# Patient Record
Sex: MALE | Race: BLACK OR AFRICAN AMERICAN | NOT HISPANIC OR LATINO | Employment: FULL TIME | URBAN - METROPOLITAN AREA
[De-identification: names, ages, dates, MRNs, and addresses within clinical notes are randomized per-mention and may not be internally consistent; named-entity substitution may affect disease eponyms.]

---
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
## 2019-12-18 ENCOUNTER — OCCUPATIONAL HEALTH (OUTPATIENT)
Dept: URGENT CARE | Facility: CLINIC | Age: 19
End: 2019-12-18

## 2019-12-18 DIAGNOSIS — Z02.1 PRE-EMPLOYMENT EXAMINATION: Primary | ICD-10-CM

## 2019-12-18 PROCEDURE — 36415 COLL VENOUS BLD VENIPUNCTURE: CPT | Mod: S$GLB,,, | Performed by: PHYSICIAN ASSISTANT

## 2019-12-18 PROCEDURE — 93000 ELECTROCARDIOGRAM COMPLETE: CPT | Mod: S$GLB,,, | Performed by: INTERNAL MEDICINE

## 2019-12-18 PROCEDURE — 99000 SPECIMEN HANDLING: ICD-10-PCS | Mod: S$GLB,,, | Performed by: PHYSICIAN ASSISTANT

## 2019-12-18 PROCEDURE — 99499 UNLISTED E&M SERVICE: CPT | Mod: S$GLB,,, | Performed by: PHYSICIAN ASSISTANT

## 2019-12-18 PROCEDURE — 93000 EKG 12-LEAD: ICD-10-PCS | Mod: S$GLB,,, | Performed by: INTERNAL MEDICINE

## 2019-12-18 PROCEDURE — 99499 PHYSICAL, BASIC COMPLEXITY: ICD-10-PCS | Mod: S$GLB,,, | Performed by: PHYSICIAN ASSISTANT

## 2019-12-18 PROCEDURE — 99000 SPECIMEN HANDLING OFFICE-LAB: CPT | Mod: S$GLB,,, | Performed by: PHYSICIAN ASSISTANT

## 2019-12-18 PROCEDURE — 36415 VENIPUNCTURE: ICD-10-PCS | Mod: S$GLB,,, | Performed by: PHYSICIAN ASSISTANT

## 2020-05-19 ENCOUNTER — HOSPITAL ENCOUNTER (EMERGENCY)
Facility: HOSPITAL | Age: 20
Discharge: HOME | End: 2020-05-19
Attending: EMERGENCY MEDICINE
Payer: MEDICAID

## 2020-05-19 VITALS
SYSTOLIC BLOOD PRESSURE: 140 MMHG | HEIGHT: 72 IN | WEIGHT: 180 LBS | BODY MASS INDEX: 24.38 KG/M2 | TEMPERATURE: 99 F | RESPIRATION RATE: 18 BRPM | HEART RATE: 76 BPM | DIASTOLIC BLOOD PRESSURE: 75 MMHG | OXYGEN SATURATION: 100 %

## 2020-05-19 DIAGNOSIS — L30.9 DERMATITIS: Primary | ICD-10-CM
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
## 2020-05-19 PROCEDURE — 99284 EMERGENCY DEPT VISIT MOD MDM: CPT

## 2020-05-19 RX ORDER — HYDROCORTISONE 1 %
CREAM (GRAM) TOPICAL
Qty: 30 G | Refills: 0 | Status: SHIPPED | OUTPATIENT
Start: 2020-05-19 | End: 2021-12-30

## 2020-05-19 RX ORDER — PERMETHRIN 50 MG/G
CREAM TOPICAL
Qty: 60 G | Refills: 1 | Status: SHIPPED | OUTPATIENT
Start: 2020-05-19 | End: 2021-12-30

## 2020-05-19 NOTE — ED NOTES
Patient presents to ED secondary to skin problem. Patient believes he has multiple insect bites to body. States was staying at hotel and it just occurred pta. Three raised spots noted to right shoulder and left third digit. Patient c/o burning and itching sensation. NAD noted.     APPEARANCE: Alert, oriented and in no acute distress.  CARDIAC: Normal rate   PERIPHERAL VASCULAR: peripheral pulses present. Normal cap refill. No edema. Warm to touch.    RESPIRATORY:Normal rate and effort. Respirations are equal and unlabored no obvious signs of distress.  GASTRO: soft, bowel sounds normal, no tenderness, no abdominal distention.  MUSC: Full ROM. No bony tenderness or soft tissue tenderness. No obvious deformity.  SKIN: Skin is warm and dry, normal skin turgor, mucous membranes moist.  NEURO: 5/5 strength major flexors/extensors bilaterally. Sensory intact to light touch bilaterally. [place] coma scale: eyes open spontaneously-4, oriented & converses-5, obeys commands-6. No neurological abnormalities.

## 2020-05-19 NOTE — ED PROVIDER NOTES
Encounter Date: 5/19/2020       History     Chief Complaint   Patient presents with    Skin Problem     Patient presents to ED secondary to skin problem. Patient believes he has multiple insect bites to body. States was staying at hotel and it just occurred pta. Three raised spots noted to right shoulder and left third digit.      Roxane Abraham is a 19 y.o. male who  has no past medical history on file.    The patient presents to the ED due to itchy rash that started just prior to arrival.  He reports checking into a hotel last night, and went to sleep, but woke up 30 minutes ago with itching to his right shoulder, right forearm, and left hand.  He reports a history of sensitive skin, but is not currently on any medications.  He initially thought the symptoms were due to his skin being sensitive, but noticed it became worse, itchy, and he noticed raised areas to his skin.  He denies any other areas of rash, fever, or other systemic symptoms.  He denies any known allergies.  He has no other complaints.        Review of patient's allergies indicates:  No Known Allergies  History reviewed. No pertinent past medical history.  History reviewed. No pertinent surgical history.  Family History   Problem Relation Age of Onset    Hypertension Mother      Social History     Tobacco Use    Smoking status: Never Smoker   Substance Use Topics    Alcohol use: No    Drug use: No     Review of Systems   Constitutional: Negative for chills and fever.   HENT: Negative for sore throat.    Respiratory: Negative for shortness of breath.    Cardiovascular: Negative for chest pain.   Gastrointestinal: Negative for constipation, diarrhea, nausea and vomiting.   Genitourinary: Negative for dysuria, frequency and urgency.   Musculoskeletal: Negative for arthralgias and back pain.   Skin: Positive for rash. Negative for wound.   Neurological: Negative for weakness.   Hematological: Does not bruise/bleed easily.    Psychiatric/Behavioral: Negative for agitation, behavioral problems and confusion.       Physical Exam     Initial Vitals [05/19/20 0037]   BP Pulse Resp Temp SpO2   (!) 140/75 76 18 99.1 °F (37.3 °C) 100 %      MAP       --         Physical Exam    Nursing note and vitals reviewed.  Constitutional: He appears well-developed and well-nourished. He is not diaphoretic. No distress.   HENT:   Head: Normocephalic and atraumatic.   Mouth/Throat: Oropharynx is clear and moist.   Eyes: EOM are normal. Pupils are equal, round, and reactive to light.   Neck: No tracheal deviation present.   Cardiovascular: Normal rate, regular rhythm, normal heart sounds and intact distal pulses.   Pulmonary/Chest: Breath sounds normal. No stridor. No respiratory distress.   Abdominal: Soft. He exhibits no distension and no mass. There is no tenderness.   Musculoskeletal: Normal range of motion. He exhibits no edema.   Neurological: He is alert and oriented to person, place, and time. No cranial nerve deficit or sensory deficit.   Skin: Skin is warm and dry. Capillary refill takes less than 2 seconds. Rash noted.   Small, slightly raised, pruritic areas to R shoulder, R forearm, and L 3rd finger.  No pustules, puncture wounds, blistering, bruising, or other skin changes.   Psychiatric: He has a normal mood and affect. His behavior is normal. Thought content normal.         ED Course   Procedures  Labs Reviewed - No data to display       Imaging Results    None          Medical Decision Making:   History:   Old Medical Records: I decided to obtain old medical records.  Old Records Summarized: other records.  Initial Assessment:   18 yo M with rash starting 30 min ago after sleeping in a new hotel room.  Vitals reassuring, exam with slightly raised, tender, pruritic areas to shoulder, R arm, and L hand.  No skin changes to suggest insect bite, scabies, or infectious process.  Possibly mild allergic reaction vs non-specific dermatitis.  Will  treat symptomatically with hydrocortisone cream. Given location, will cover for scabies if symptoms do not improve, RX permethrin and instructed on proper use. No diffuse rash or MM involvement.   Patient stable for D/C.  Differential Diagnosis:   Differential Diagnosis includes, but is not limited to:  Cellulitis, MRSA, drug eruption, allergic reaction, contact dermatitis, viral exanthem, local trauma/contusion.  ED Management:  After complete evaluation, including thorough history and physical exam, the patient's symptoms are most consistent with non-specific dermatitis, possible allergic reaction. The exact etiology of the patient's rash is currently unknown, but appears to be benign at this time. There are no concerning features to suggest acute infection, cellulitis, abscess, or necrotizing fasciitis.  There is no diffuse skin or mucous membrane involvement to suggest TEN/SJS. The appearance does not suggest Lyme/tick disease, syphilis, scabies/bedbugs, or fungal infection.    At this time, I feel there is no emergent condition requiring admission or further testing. Findings and clinical impression discussed with patient. Questions were answered, and patient stated understanding. Patient instructed to follow-up with their PCP or the one provided in 2-3 days. Strict return precautions were discussed, including new or worsening symptoms.      On re-evaluation, the patient's status has improved.  After complete ED evaluation, clinical impression is most consistent with dermatitis/allergic reaction.  PCP follow-up within 2-3 days was recommended.    After taking into careful account the patient's history, physical exam findings, as well as empirical and objective data obtained throughout ED workup, I feel no emergent medical condition has been identified. No further evaluation or admission was felt to be required, and the patient is stable for discharge from the ED. The patient and any additional family present  were updated with test results, overall clinical impression, and recommended further plan of care, including discharge instructions as provided and outpatient follow-up for continued evaluation and management as needed. All questions were answered. The patient expressed understanding and agreed with current plan for discharge and follow-up plan of care. Strict ED return precautions were provided, including return/worsening of current symptoms, new symptoms, or any other concerns.                                   Clinical Impression:       ICD-10-CM ICD-9-CM   1. Dermatitis L30.9 692.9   2. Insect bite, unspecified site, initial encounter W57.XXXA 919.4     E906.4             ED Disposition Condition    Discharge Stable        ED Prescriptions     Medication Sig Dispense Start Date End Date Auth. Provider    hydrocortisone 1 % cream Apply to affected area 2 times daily 30 g 5/19/2020  Tristin De Paz MD    permethrin (ELIMITE) 5 % cream Apply to entire body from neck to toes one time.  Leave on for 12 hours then wash off.  Reapply in 1 week if rash does not improve. 60 g 5/19/2020  Tristin De Paz MD        Follow-up Information     Follow up With Specialties Details Why Contact Info    Elvira Kruger MD Family Medicine Schedule an appointment as soon as possible for a visit   [address] [facility] [address]  [phone]                                       Tristin De Paz MD  05/19/20 0053

## 2020-05-19 NOTE — DISCHARGE INSTRUCTIONS
Your rash appears to be due to a nonspecific dermatitis, or possibly a mild allergic reaction. You may take benadryl and use hydrocortisone cream to help with itching.  If these treatments do not improve, or you notice worsening/spreading rash, your symptoms may be due to insect/scabies bites. Use permethrin cream as directed to treat your symptoms.   Follow up with your PCP as soon as possible for re-evaluation if your symptoms do not improve.  Return to the ED immediately if you develop any diffuse or entire-body rash, difficulty breathing, mouth/face swelling, or any other concerning symptoms.

## 2021-09-21 ENCOUNTER — HOSPITAL ENCOUNTER (EMERGENCY)
Facility: HOSPITAL | Age: 21
Discharge: HOME | End: 2021-09-21
Attending: EMERGENCY MEDICINE
Payer: MEDICAID

## 2021-09-21 VITALS
HEART RATE: 83 BPM | WEIGHT: 210 LBS | RESPIRATION RATE: 16 BRPM | SYSTOLIC BLOOD PRESSURE: 134 MMHG | TEMPERATURE: 98 F | HEIGHT: 72 IN | BODY MASS INDEX: 28.44 KG/M2 | OXYGEN SATURATION: 99 % | DIASTOLIC BLOOD PRESSURE: 70 MMHG

## 2021-09-21 DIAGNOSIS — J06.9 VIRAL URI: Primary | ICD-10-CM

## 2021-09-21 LAB
CTP QC/QA: YES
GROUP A STREP, MOLECULAR: NEGATIVE
SARS-COV-2 RDRP RESP QL NAA+PROBE: NEGATIVE

## 2021-09-21 PROCEDURE — 87651 STREP A DNA AMP PROBE: CPT | Performed by: PHYSICIAN ASSISTANT

## 2021-09-21 PROCEDURE — 99282 EMERGENCY DEPT VISIT SF MDM: CPT | Mod: 25

## 2021-09-21 PROCEDURE — U0002 COVID-19 LAB TEST NON-CDC: HCPCS | Performed by: NURSE PRACTITIONER

## 2021-12-30 ENCOUNTER — HOSPITAL ENCOUNTER (EMERGENCY)
Facility: HOSPITAL | Age: 21
Discharge: LEFT BEFORE DISCHARGE | End: 2021-12-30
Payer: MEDICAID

## 2021-12-30 VITALS
TEMPERATURE: 99 F | WEIGHT: 220 LBS | HEIGHT: 72 IN | HEART RATE: 90 BPM | BODY MASS INDEX: 29.8 KG/M2 | RESPIRATION RATE: 16 BRPM | DIASTOLIC BLOOD PRESSURE: 72 MMHG | SYSTOLIC BLOOD PRESSURE: 114 MMHG | OXYGEN SATURATION: 99 %

## 2021-12-30 PROCEDURE — 99283 EMERGENCY DEPT VISIT LOW MDM: CPT | Mod: 25

## 2021-12-30 PROCEDURE — U0005 INFEC AGEN DETEC AMPLI PROBE: HCPCS | Performed by: EMERGENCY MEDICINE

## 2021-12-30 PROCEDURE — U0003 INFECTIOUS AGENT DETECTION BY NUCLEIC ACID (DNA OR RNA); SEVERE ACUTE RESPIRATORY SYNDROME CORONAVIRUS 2 (SARS-COV-2) (CORONAVIRUS DISEASE [COVID-19]), AMPLIFIED PROBE TECHNIQUE, MAKING USE OF HIGH THROUGHPUT TECHNOLOGIES AS DESCRIBED BY CMS-2020-01-R: HCPCS | Performed by: EMERGENCY MEDICINE

## 2021-12-30 RX ORDER — ONDANSETRON 4 MG/1
4 TABLET, ORALLY DISINTEGRATING ORAL
Status: DISCONTINUED | OUTPATIENT
Start: 2021-12-30 | End: 2021-12-30 | Stop reason: HOSPADM

## 2021-12-30 NOTE — FIRST PROVIDER EVALUATION
Emergency Department TeleTriage Encounter Note      CHIEF COMPLAINT    Chief Complaint   Patient presents with    COVID-19 Concerns     Abdominal pain with N/V since 4 am - employer sent him to be tested for COVID. Last emesis at 5:30 am today. No fever. Continues with mild cramping with diarrhea. No distress noted.        VITAL SIGNS   Initial Vitals [12/30/21 1615]   BP Pulse Resp Temp SpO2   114/72 90 16 98.9 °F (37.2 °C) 99 %      MAP       --            ALLERGIES    Review of patient's allergies indicates:  No Known Allergies    PROVIDER TRIAGE NOTE  This is a teletriage evaluation of a 21 y.o. male presenting to the ED complaining of abd pain with n/v starting today.  Last vomited at 0530.  Diarrhea persists.  Denies fever and urinary symptoms.     Initial orders will be placed and care will be transferred to an alternate provider when patient is roomed for a full evaluation. Any additional orders and the final disposition will be determined by that provider.           ORDERS  Labs Reviewed   SARS-COV-2 (COVID-19) QUALITATIVE PCR       ED Orders (720h ago, onward)    Start Ordered     Status Ordering Provider    12/30/21 1800 12/30/21 1745  ondansetron disintegrating tablet 4 mg  ED 1 Time         Ordered TU VIEIRA N.    12/30/21 1746 12/30/21 1745  Airborne and Contact and Droplet Isolation Status  Continuous         Ordered TU VIEIRA N.    12/30/21 1734 12/30/21 1733  COVID-19 Routine Screening  STAT         In process TENA HAMMER            Virtual Visit Note: The provider triage portion of this emergency department evaluation and documentation was performed via [employer], a HIPAA-compliant telemedicine application, in concert with a tele-presenter in the room. A face to face patient evaluation with one of my colleagues will occur once the patient is placed in an emergency department room.      DISCLAIMER: This note was prepared with M*Modal voice recognition transcription  software. Garbled syntax, mangled pronouns, and other bizarre constructions may be attributed to that software system.

## 2022-01-01 LAB
SARS-COV-2 RNA RESP QL NAA+PROBE: NOT DETECTED
SARS-COV-2- CYCLE NUMBER: NORMAL

## 2022-10-11 ENCOUNTER — OFFICE VISIT (OUTPATIENT)
Dept: URGENT CARE | Facility: CLINIC | Age: 22
End: 2022-10-11
Payer: MEDICAID

## 2022-10-11 VITALS
OXYGEN SATURATION: 98 % | HEIGHT: 72 IN | BODY MASS INDEX: 29.8 KG/M2 | DIASTOLIC BLOOD PRESSURE: 70 MMHG | HEART RATE: 86 BPM | TEMPERATURE: 98 F | WEIGHT: 220 LBS | RESPIRATION RATE: 20 BRPM | SYSTOLIC BLOOD PRESSURE: 122 MMHG

## 2022-10-11 DIAGNOSIS — M25.569 KNEE PAIN, UNSPECIFIED CHRONICITY, UNSPECIFIED LATERALITY: ICD-10-CM

## 2022-10-11 DIAGNOSIS — S00.81XA ABRASION OF FACE, INITIAL ENCOUNTER: ICD-10-CM

## 2022-10-11 DIAGNOSIS — S83.412A SPRAIN OF MEDIAL COLLATERAL LIGAMENT OF LEFT KNEE, INITIAL ENCOUNTER: Primary | ICD-10-CM

## 2022-10-11 PROCEDURE — 99214 OFFICE O/P EST MOD 30 MIN: CPT | Mod: S$GLB,,, | Performed by: PHYSICIAN ASSISTANT

## 2022-10-11 PROCEDURE — 3078F DIAST BP <80 MM HG: CPT | Mod: CPTII,S$GLB,, | Performed by: PHYSICIAN ASSISTANT

## 2022-10-11 PROCEDURE — 3074F SYST BP LT 130 MM HG: CPT | Mod: CPTII,S$GLB,, | Performed by: PHYSICIAN ASSISTANT

## 2022-10-11 PROCEDURE — 1159F MED LIST DOCD IN RCRD: CPT | Mod: CPTII,S$GLB,, | Performed by: PHYSICIAN ASSISTANT

## 2022-10-11 PROCEDURE — 1159F PR MEDICATION LIST DOCUMENTED IN MEDICAL RECORD: ICD-10-PCS | Mod: CPTII,S$GLB,, | Performed by: PHYSICIAN ASSISTANT

## 2022-10-11 PROCEDURE — 3008F BODY MASS INDEX DOCD: CPT | Mod: CPTII,S$GLB,, | Performed by: PHYSICIAN ASSISTANT

## 2022-10-11 PROCEDURE — 3008F PR BODY MASS INDEX (BMI) DOCUMENTED: ICD-10-PCS | Mod: CPTII,S$GLB,, | Performed by: PHYSICIAN ASSISTANT

## 2022-10-11 PROCEDURE — 1160F RVW MEDS BY RX/DR IN RCRD: CPT | Mod: CPTII,S$GLB,, | Performed by: PHYSICIAN ASSISTANT

## 2022-10-11 PROCEDURE — 73562 X-RAY EXAM OF KNEE 3: CPT | Mod: FY,LT,S$GLB, | Performed by: RADIOLOGY

## 2022-10-11 PROCEDURE — 1160F PR REVIEW ALL MEDS BY PRESCRIBER/CLIN PHARMACIST DOCUMENTED: ICD-10-PCS | Mod: CPTII,S$GLB,, | Performed by: PHYSICIAN ASSISTANT

## 2022-10-11 PROCEDURE — 3078F PR MOST RECENT DIASTOLIC BLOOD PRESSURE < 80 MM HG: ICD-10-PCS | Mod: CPTII,S$GLB,, | Performed by: PHYSICIAN ASSISTANT

## 2022-10-11 PROCEDURE — 3074F PR MOST RECENT SYSTOLIC BLOOD PRESSURE < 130 MM HG: ICD-10-PCS | Mod: CPTII,S$GLB,, | Performed by: PHYSICIAN ASSISTANT

## 2022-10-11 PROCEDURE — 73562 XR KNEE 3 VIEW LEFT: ICD-10-PCS | Mod: FY,LT,S$GLB, | Performed by: RADIOLOGY

## 2022-10-11 PROCEDURE — 99214 PR OFFICE/OUTPT VISIT, EST, LEVL IV, 30-39 MIN: ICD-10-PCS | Mod: S$GLB,,, | Performed by: PHYSICIAN ASSISTANT

## 2022-10-11 RX ORDER — KETOROLAC TROMETHAMINE 30 MG/ML
30 INJECTION, SOLUTION INTRAMUSCULAR; INTRAVENOUS
Status: COMPLETED | OUTPATIENT
Start: 2022-10-11 | End: 2022-10-11

## 2022-10-11 RX ORDER — MUPIROCIN 20 MG/G
OINTMENT TOPICAL 2 TIMES DAILY
Qty: 15 G | Refills: 0 | Status: SHIPPED | OUTPATIENT
Start: 2022-10-11

## 2022-10-11 RX ADMIN — KETOROLAC TROMETHAMINE 30 MG: 30 INJECTION, SOLUTION INTRAMUSCULAR; INTRAVENOUS at 05:10

## 2022-10-11 NOTE — PROGRESS NOTES
Subjective:       Patient ID: Roxane Abraahm is a 21 y.o. male.    Chief Complaint: Knee Injury (Left knee injury - fall/)    HPI  ROS     Objective:      Physical Exam    Assessment:       1. Knee pain, unspecified chronicity, unspecified laterality          Plan:                   No follow-ups on file.

## 2022-10-11 NOTE — PROGRESS NOTES
Subjective:       Patient ID: Roxane Abraham is a 21 y.o. male.    Vitals:  height is 6' (1.829 m) and weight is 99.8 kg (220 lb). His temperature is 98.1 °F (36.7 °C). His blood pressure is 122/70 and his pulse is 86. His respiration is 20 and oxygen saturation is 98%.     Chief Complaint: Knee Injury (Left knee injury - fall/)    Roxane presents for evaluation of left knee pain after a fall.  He was moving furniture for his mother last night and was carrying a very heavy item when his left knee gave out and he fell on it.  He also scratched his face right below his eyes.  He denies any loss of consciousness.  He denies any vision changes or eye pain.  His complaint today is medial left knee pain that is worse with weight-bearing.  It is also worse with flexion of the knee.  He has not tried anything for symptoms.    Knee Injury  This is a new problem. The current episode started yesterday. The problem has been gradually worsening. Associated symptoms include arthralgias. Pertinent negatives include no abdominal pain, chest pain, chills, coughing, diaphoresis, fatigue, fever, headaches, nausea, neck pain, rash, sore throat, vertigo or vomiting. He has tried nothing for the symptoms.     Constitution: Negative for appetite change, chills, sweating, fatigue and fever.   HENT:  Negative for ear pain, ear discharge, postnasal drip, sinus pain, sinus pressure and sore throat.    Neck: Negative for neck pain and neck stiffness.   Cardiovascular:  Negative for chest trauma, chest pain, leg swelling, palpitations, sob on exertion and passing out.   Eyes:  Negative for blurred vision.   Respiratory:  Negative for cough and shortness of breath.    Gastrointestinal:  Negative for abdominal pain, nausea, vomiting and diarrhea.   Genitourinary:  Negative for dysuria, frequency and urgency.   Musculoskeletal:  Positive for pain, trauma, joint pain and pain with walking.   Skin:  Positive for abrasion. Negative for  rash.   Neurological:  Negative for dizziness, history of vertigo, light-headedness, passing out, facial drooping, speech difficulty, coordination disturbances, loss of balance and headaches.   Hematologic/Lymphatic: Negative for easy bruising/bleeding. Does not bruise/bleed easily.   Psychiatric/Behavioral:  Negative for confusion.      Objective:      Physical Exam   Constitutional: He is oriented to person, place, and time. He appears well-developed. He is cooperative.  Non-toxic appearance. He does not appear ill. No distress.   HENT:   Head: Normocephalic and atraumatic. Head is without abrasion, without contusion and without laceration.       Ears:   Right Ear: Hearing, tympanic membrane, external ear and ear canal normal. No hemotympanum.   Left Ear: Hearing, tympanic membrane, external ear and ear canal normal. No hemotympanum.   Nose: Nose normal. No mucosal edema, rhinorrhea or nasal deformity. No epistaxis. Right sinus exhibits no maxillary sinus tenderness and no frontal sinus tenderness. Left sinus exhibits no maxillary sinus tenderness and no frontal sinus tenderness.   Mouth/Throat: Uvula is midline, oropharynx is clear and moist and mucous membranes are normal. No trismus in the jaw. Normal dentition. No uvula swelling. No posterior oropharyngeal erythema.   Eyes: Conjunctivae, EOM and lids are normal. Pupils are equal, round, and reactive to light. Lids are everted and swept, no foreign bodies found. No visual field deficit is present. Right eye exhibits no chemosis, no discharge, no exudate and no hordeolum. No foreign body present in the right eye. Left eye exhibits no chemosis, no discharge, no exudate and no hordeolum. No foreign body present in the left eye. Right conjunctiva is not injected. Right conjunctiva has no hemorrhage. Left conjunctiva is not injected. Left conjunctiva has no hemorrhage. No scleral icterus. Right eye exhibits normal extraocular motion and no nystagmus. Left eye  exhibits normal extraocular motion and no nystagmus. Extraocular movement intact vision grossly intact gaze aligned appropriately periorbital hyperpigmentation  Neck: Trachea normal and phonation normal. Neck supple. No tracheal deviation present. No neck rigidity present. No spinous process tenderness present. No muscular tenderness present.   Cardiovascular: Normal rate, regular rhythm, normal heart sounds and normal pulses.   Pulmonary/Chest: Effort normal and breath sounds normal. No respiratory distress.   Abdominal: Normal appearance and bowel sounds are normal. He exhibits no distension and no mass. Soft. There is no abdominal tenderness.   Musculoskeletal: Normal range of motion.         General: No deformity. Normal range of motion.        Legs:       Comments: TTP medial left knee.  Mild edema.  Pain with valgus stress test.  Full ROM.  No abn laxity.  LLE is NVI.    Neurological: He is alert and oriented to person, place, and time. He has normal strength. No cranial nerve deficit or sensory deficit. He exhibits normal muscle tone. He displays no seizure activity. Coordination normal. GCS eye subscore is 4. GCS verbal subscore is 5. GCS motor subscore is 6.   Skin: Skin is warm, dry, intact, not diaphoretic and not pale. Capillary refill takes less than 2 seconds. No abrasion, No burn, No bruising and No ecchymosis   Psychiatric: His speech is normal and behavior is normal. Judgment and thought content normal.   Nursing note and vitals reviewed.        XR L knee - No acute displaced fracture or dislocation of the left knee.    Assessment:       1. Sprain of medial collateral ligament of left knee, initial encounter    2. Knee pain, unspecified chronicity, unspecified laterality    3. Abrasion of face, initial encounter            Plan:         Sprain of medial collateral ligament of left knee, initial encounter  -     KNEE BRACE FOR HOME USE    Knee pain, unspecified chronicity, unspecified laterality  -      XR KNEE 3 VIEW LEFT; Future; Expected date: 10/11/2022    Abrasion of face, initial encounter    Other orders  -     mupirocin (BACTROBAN) 2 % ointment; Apply topically 2 (two) times daily.  Dispense: 15 g; Refill: 0  -     ketorolac injection 30 mg    Diagnoses and plan discussed with the patient, as well as the expected course and duration of his symptoms.  All questions and concerns were addressed prior to discharge.  He was advised to follow up with his PCP within 1 week if symptoms do not improve.  Emergency department precautions were given.  Patient verbalized understanding and was happy with the plan of care.   Note dictated with voice recognition software, please excuse any grammatical errors.    Patient Instructions   PLEASE READ YOUR DISCHARGE INSTRUCTIONS ENTIRELY AS IT CONTAINS IMPORTANT INFORMATION.  Please wear the knee brace while walking and bearing weight for the next 1-2 weeks until symptoms resolve.  - Rest.    - Drink plenty of fluids.    - Tylenol or Ibuprofen as directed as needed for fever/pain.    - If you were prescribed antibiotics, please take them to completion.  - If you are female and on birth control pills - please use additional methods of contraception to prevent pregnancy while on antibiotics and for one cycle after.   - If you were prescribed a narcotic medication, a cough syrup, or a muscle relaxer, do not drive or operate heavy equipment or machinery while taking these medications, as they can cause drowsiness.   - If a referral to a specialty was made today, you should receive a phone call in the next few days to schedule an appt.  Please call [phone] to schedule an appt if have not gotten a phone call in the next few days.  - If you smoke, please stop smoking.  -You must understand that you've received an Urgent Care treatment only and that you may be released before all your medical problems are known or treated. You, the patient, will arrange for follow up care as  instructed. Please arrange follow up with your primary medical clinic as soon as possible.   - Follow up with your PCP or specialty clinic as directed in the next 1-2 weeks if not improved or as needed.  You can call [phone] to schedule an appointment with the appropriate provider.    - Please return to Urgent Care or to the Emergency Department if your symptoms worsen.    Patient aware and verbalized understanding.

## 2022-10-11 NOTE — PATIENT INSTRUCTIONS
PLEASE READ YOUR DISCHARGE INSTRUCTIONS ENTIRELY AS IT CONTAINS IMPORTANT INFORMATION.  Please wear the knee brace while walking and bearing weight for the next 1-2 weeks until symptoms resolve.  - Rest.    - Drink plenty of fluids.    - Tylenol or Ibuprofen as directed as needed for fever/pain.    - If you were prescribed antibiotics, please take them to completion.  - If you are female and on birth control pills - please use additional methods of contraception to prevent pregnancy while on antibiotics and for one cycle after.   - If you were prescribed a narcotic medication, a cough syrup, or a muscle relaxer, do not drive or operate heavy equipment or machinery while taking these medications, as they can cause drowsiness.   - If a referral to a specialty was made today, you should receive a phone call in the next few days to schedule an appt.  Please call [phone] to schedule an appt if have not gotten a phone call in the next few days.  - If you smoke, please stop smoking.  -You must understand that you've received an Urgent Care treatment only and that you may be released before all your medical problems are known or treated. You, the patient, will arrange for follow up care as instructed. Please arrange follow up with your primary medical clinic as soon as possible.   - Follow up with your PCP or specialty clinic as directed in the next 1-2 weeks if not improved or as needed.  You can call [phone] to schedule an appointment with the appropriate provider.    - Please return to Urgent Care or to the Emergency Department if your symptoms worsen.    Patient aware and verbalized understanding.

## 2022-10-21 ENCOUNTER — OFFICE VISIT (OUTPATIENT)
Dept: URGENT CARE | Facility: CLINIC | Age: 22
End: 2022-10-21
Payer: MEDICAID

## 2022-10-21 VITALS
HEIGHT: 72 IN | RESPIRATION RATE: 16 BRPM | TEMPERATURE: 98 F | OXYGEN SATURATION: 97 % | WEIGHT: 220 LBS | SYSTOLIC BLOOD PRESSURE: 129 MMHG | BODY MASS INDEX: 29.8 KG/M2 | DIASTOLIC BLOOD PRESSURE: 81 MMHG | HEART RATE: 92 BPM

## 2022-10-21 DIAGNOSIS — M25.562 LEFT MEDIAL KNEE PAIN: Primary | ICD-10-CM

## 2022-10-21 PROCEDURE — 3074F PR MOST RECENT SYSTOLIC BLOOD PRESSURE < 130 MM HG: ICD-10-PCS | Mod: CPTII,S$GLB,, | Performed by: PHYSICIAN ASSISTANT

## 2022-10-21 PROCEDURE — 3074F SYST BP LT 130 MM HG: CPT | Mod: CPTII,S$GLB,, | Performed by: PHYSICIAN ASSISTANT

## 2022-10-21 PROCEDURE — 99214 PR OFFICE/OUTPT VISIT, EST, LEVL IV, 30-39 MIN: ICD-10-PCS | Mod: S$GLB,,, | Performed by: PHYSICIAN ASSISTANT

## 2022-10-21 PROCEDURE — 1160F PR REVIEW ALL MEDS BY PRESCRIBER/CLIN PHARMACIST DOCUMENTED: ICD-10-PCS | Mod: CPTII,S$GLB,, | Performed by: PHYSICIAN ASSISTANT

## 2022-10-21 PROCEDURE — 1160F RVW MEDS BY RX/DR IN RCRD: CPT | Mod: CPTII,S$GLB,, | Performed by: PHYSICIAN ASSISTANT

## 2022-10-21 PROCEDURE — 99214 OFFICE O/P EST MOD 30 MIN: CPT | Mod: S$GLB,,, | Performed by: PHYSICIAN ASSISTANT

## 2022-10-21 PROCEDURE — 1159F MED LIST DOCD IN RCRD: CPT | Mod: CPTII,S$GLB,, | Performed by: PHYSICIAN ASSISTANT

## 2022-10-21 PROCEDURE — 3079F DIAST BP 80-89 MM HG: CPT | Mod: CPTII,S$GLB,, | Performed by: PHYSICIAN ASSISTANT

## 2022-10-21 PROCEDURE — 1159F PR MEDICATION LIST DOCUMENTED IN MEDICAL RECORD: ICD-10-PCS | Mod: CPTII,S$GLB,, | Performed by: PHYSICIAN ASSISTANT

## 2022-10-21 PROCEDURE — 3079F PR MOST RECENT DIASTOLIC BLOOD PRESSURE 80-89 MM HG: ICD-10-PCS | Mod: CPTII,S$GLB,, | Performed by: PHYSICIAN ASSISTANT

## 2022-10-21 NOTE — PATIENT INSTRUCTIONS
You must understand that you've received an Urgent Care treatment only and that you may be released before all your medical problems are known or treated. You, the patient, will arrange for follow up care as instructed.    Follow up with your PCP or specialty clinic as directed in the next 1-2 weeks if not improved or as needed. You can call [phone] to schedule an appointment with the appropriate provider.    If your condition worsens we recommend that you receive another evaluation at the emergency room immediately or contact your primary medical clinic's after hours call service to discuss your concerns.    Please go to the Emergency Department for any concerns or worsening of condition.

## 2022-10-21 NOTE — PROGRESS NOTES
Subjective:       Patient ID: Roxane Abraham is a 21 y.o. male.    Vitals:  height is 6' (1.829 m) and weight is 99.8 kg (220 lb). His oral temperature is 98.4 °F (36.9 °C). His blood pressure is 129/81 and his pulse is 92. His respiration is 16 and oxygen saturation is 97%.     Chief Complaint: Knee Pain    Pt c/o left knee pain and swelling for 2 weeks.  Pt last seen in the clinic on 10/11 due to knee pain.  Pt states he was helping his mother move furniture when his knee gave out on him and fell.  Pt injured his left knee at that time.  He states that he continues to have slight pain and swelling.  Pt uses a brace on his knee but reports that it can sometimes make the pain worse.  He does report PMH ACL tear in left knee.       Knee Pain   The incident occurred more than 1 week ago. The incident occurred at home. There was no injury mechanism. The pain is at a severity of 0/10. The patient is experiencing no pain. Pertinent negatives include no inability to bear weight, loss of motion, loss of sensation, muscle weakness or numbness. He reports no foreign bodies present. The symptoms are aggravated by movement. He has tried nothing for the symptoms.     Constitution: Negative for chills and sweating.   Cardiovascular:  Negative for chest pain.   Respiratory:  Negative for cough and shortness of breath.    Gastrointestinal:  Negative for vomiting.   Musculoskeletal:  Positive for pain, joint pain and joint swelling.   Skin:  Negative for erythema.   Neurological:  Negative for numbness.     Objective:      Physical Exam   Constitutional: He is oriented to person, place, and time. He appears well-developed.   HENT:   Head: Normocephalic and atraumatic. Head is without abrasion, without contusion and without laceration.   Ears:   Right Ear: External ear normal.   Left Ear: External ear normal.   Eyes: Conjunctivae, EOM and lids are normal. Pupils are equal, round, and reactive to light.   Neck: Phonation  normal. Neck supple.   Cardiovascular: Normal rate.   Pulmonary/Chest: Effort normal. No respiratory distress.   Musculoskeletal: Normal range of motion.         General: Normal range of motion.      Left knee: He exhibits swelling. He exhibits normal range of motion, normal alignment, no bony tenderness and no MCL laxity. Tenderness found. Medial joint line tenderness noted.        Legs:       Comments: Moderate swelling of left knee   Neurological: no focal deficit. He is alert and oriented to person, place, and time.      Comments: CN's grossly intact   Skin: Skin is warm, dry, intact and no rash. Capillary refill takes less than 2 seconds. No abrasion, No burn, No bruising, No erythema and No ecchymosis   Psychiatric: His speech is normal and behavior is normal. Judgment and thought content normal.   Nursing note and vitals reviewed.      Assessment:       1. Left medial knee pain          Plan:         Left medial knee pain  -     MRI Knee Without Contrast Left; Future; Expected date: 10/21/2022  -     Ambulatory referral/consult to Orthopedics         Medical Decision Making:   History:   Old Records Summarized: records from clinic visits.  Initial Assessment:   21 y.o. male with PMH ACL tear of left knee diagnosed with left knee pain.  Clinic  contacted.  Pt scheduled for MRI of left knee on 11/3.  Ortho referral also placed due to persistent symptoms and h/o ACL tear.  Pt advised that PT may be warranted.  Use ice 3 times a day due to swelling and may use heat at night.  X-ray negative for fx at last clinic visit.  Differential Diagnosis:   Knee sprain, meniscus tear       Patient Instructions   You must understand that you've received an Urgent Care treatment only and that you may be released before all your medical problems are known or treated. You, the patient, will arrange for follow up care as instructed.    Follow up with your PCP or specialty clinic as directed in the next 1-2 weeks if not  improved or as needed. You can call [phone] to schedule an appointment with the appropriate provider.    If your condition worsens we recommend that you receive another evaluation at the emergency room immediately or contact your primary medical clinic's after hours call service to discuss your concerns.    Please go to the Emergency Department for any concerns or worsening of condition.      Patient Education        Knee Pain Discharge Instructions   About this topic   The knee is a large and complex joint. It is made up of 4 bones: the thigh bone, two lower leg bones, and the kneecap. Your kneecap is also called your patella. You may have pain in the front or side of your knee.     What care is needed at home?   Ask your doctor what you need to do when you go home. Make sure you ask questions if you do not understand what the doctor says. This way you will know what you need to do.  Rest. Allow your injury to heal before you do slow movements.  Place an ice pack or a bag of frozen peas wrapped in a towel over the painful part. Never put ice right on the skin. Do not leave the ice on more than 10 to 15 minutes at a time. Ice after activity may help decrease pain and swelling. Never ice before stretching.  Prop your knee on pillows to help with swelling.  Use a knee brace if the doctor tells you to do this.  Apply tape to the kneecap if your therapist or  teaches you how to do this.  Wear good supportive shoes. Get inserts for your shoes if you have flat feet.  Do exercises for stretching and strengthening.  Lose weight if you are overweight. Being overweight puts stress on your knees.  What follow-up care is needed?   Your doctors may ask you to make visits to the office to check on your progress. Be sure to keep these visits.  You may also need to see a physical therapist (PT). The PT will teach you exercises to help you get back your strength and motion.  What drugs may be needed?   The doctor may  order drugs to:  Help with pain and swelling  The doctor may give you a shot of an anti-inflammatory drug called a corticosteroid. This will help with swelling.  Will physical activity be limited?   You may need to rest your knee for a while. You should not do physical activity that makes your health problem worse. If you run, work out, or play sports, you may not be able to do those things until your health problem gets better.  What problems could happen?   Injury to cartilage leading to arthritis  Immobility and weight gain  What can be done to prevent this health problem?   Stay active and work out to keep your muscles strong and flexible.  Warm up slowly and stretch your muscles before you work out. Use good ways to train, such as slowly adding to how far you run. Do not work out if you are overly tired. Take extra care if working out in cold weather.  Take breaks often when doing things that use repeat movements.  Avoid running on hard or uneven surfaces.  Wear shoes with good support and traction. Do not go barefoot.  Wear a compression bandage to support your knee.  Keep a healthy weight so there is not extra stress on your joints.  When do I need to call the doctor?   More trouble getting up from a chair, going up and down stairs, or walking  Pain, swelling, warmth, numbness, tingling, or discoloration in the calf below the injured or sore knee  You are not feeling better in 2 or 3 days or you are feeling worse  Helpful tips   Try swimming or water aerobics to have less impact on your knee.  Avoid running down hills. Walk down instead or try running in a zigzag pattern to lessen the stress on the front of the knee.  If going up and down stairs is painful, try going up or down sideways until the pain lessens.  Teach Back: Helping You Understand   The Teach Back Method helps you understand the information we are giving you. After you talk with the staff, tell them in your own words what you learned. This  helps to make sure the staff has described each thing clearly. It also helps to explain things that may have been confusing. Before going home, make sure you can do these:  I can tell you about my condition.  I can tell you what may help ease my pain.  I can tell you what I will do if I have more trouble getting up from a chair, going up or down stairs, or walking.  Where can I learn more?   [facility]  http://kidshealth.org/parent/medical/bones/knee_injuries.html   [facility] Choices  http://www.nhs.uk/conditions/knee-pain/Pages/Introduction.aspx   Last Reviewed Date   2020-10-12  Consumer Information Use and Disclaimer   This information is not specific medical advice and does not replace information you receive from your health care provider. This is only a brief summary of general information. It does NOT include all information about conditions, illnesses, injuries, tests, procedures, treatments, therapies, discharge instructions or life-style choices that may apply to you. You must talk with your health care provider for complete information about your health and treatment options. This information should not be used to decide whether or not to accept your health care providers advice, instructions or recommendations. Only your health care provider has the knowledge and training to provide advice that is right for you.  Copyright   Copyright © 2021 "[employer] and its affiliates and/or licensors. All rights reserved.

## 2022-10-28 ENCOUNTER — HOSPITAL ENCOUNTER (OUTPATIENT)
Dept: RADIOLOGY | Facility: HOSPITAL | Age: 22
Discharge: HOME | End: 2022-10-28
Attending: PHYSICIAN ASSISTANT
Payer: MEDICAID

## 2022-10-28 DIAGNOSIS — M25.562 LEFT MEDIAL KNEE PAIN: ICD-10-CM

## 2022-10-28 PROCEDURE — 73721 MRI JNT OF LWR EXTRE W/O DYE: CPT | Mod: 26,LT,, | Performed by: RADIOLOGY

## 2022-10-28 PROCEDURE — 73721 MRI JNT OF LWR EXTRE W/O DYE: CPT | Mod: TC,LT

## 2022-10-28 PROCEDURE — 73721 MRI KNEE WITHOUT CONTRAST LEFT: ICD-10-PCS | Mod: 26,LT,, | Performed by: RADIOLOGY

## 2022-11-28 ENCOUNTER — TELEPHONE (OUTPATIENT)
Dept: URGENT CARE | Facility: CLINIC | Age: 22
End: 2022-11-28
Payer: MEDICAID

## 2022-11-28 DIAGNOSIS — S83.512A RUPTURE OF ANTERIOR CRUCIATE LIGAMENT OF LEFT KNEE, INITIAL ENCOUNTER: ICD-10-CM

## 2022-11-28 DIAGNOSIS — S83.412A COMPLETE TEAR OF MEDIAL COLLATERAL LIGAMENT OF LEFT KNEE, INITIAL ENCOUNTER: Primary | ICD-10-CM

## 2022-11-28 NOTE — TELEPHONE ENCOUNTER
Pancho Smith MD  [phone]  [phone] 10/29/2022 STAT     Narrative & Impression  EXAMINATION:  MRI KNEE WITHOUT CONTRAST LEFT     CLINICAL HISTORY:  Knee trauma, internal derangement suspected, xray done;twisted left knee >3 weeks ago; knee instability; swelling;Pain in left knee     TECHNIQUE:  Multiplanar, multisequence images were performed about the LEFT knee.     COMPARISON:  10/11/2022     FINDINGS:  **MENISCI:     Medial meniscus: Intact anterior horn, body, and posterior horn.  Horizontally oriented signal does not extend definitively to the articular surface.  There is mild posterior capsular irregularity.     Lateral meniscus: Complex tear lateral meniscus with large proximally 1 cm meniscal defect posterior horn (axial image 16 coronal images 20/21) with approximately 1 cm near meniscal gap consistent with large complex posterior horn tear.  Posterior meniscal fascicles are nonvisualized, likely disrupted with adjacent edema.     Meniscal root attachment: Complex tear posterior horn lateral meniscus extends into posterior meniscal root.     Popliteal hiatus:Intact     POSTEROLATERAL CORNER:     Posterior lateral corner edema: Present     Fibular collateral ligament: Partial tear mid and distal aspect with adjacent edema     Popliteus muscle/tendon: Myotendinous injury with edema at that level.     Popliteofibular ligament: At least high-grade partial tear, likely disrupted with adjacent edema.  There is also evidence for bone marrow edema identified level of the fibular apex consistent with arcuate sign, which has high incidence of associated injuries to the posterolateral capsule and arcuate ligaments.  The posterior capsule and arcuate ligament appears disrupted.     Biceps femoris: Strain with intrinsic signal intensity distally     Meniscal fascicles: Torn     Iliotibial band: Intact     Anterolateral ligament: Intact     Common peroneal nerve: Unremarkable appearance does not exclude  injury.     RAMP LESION EVALUATION:     * Irregularity of posteror margin MM: Present     * Menisco-tibial ligament :Torn/Disrupted     * Menisco-capsular ligament:Non-visualized     * Complete fluid filling between PHMM and capsule:Absent     * Edema of posterior tibial margin: Present     Posteromedial corner injury yet no definite evidence for meniscal tear to suggest ramp lesion with certainty.  There is evidence for suggestion of mild meniscocapsular separation     **LIGAMENTS:     Anterior cruciate ligament: Complete rupture.     Posterior cruciate ligament: Continuous, with normal signal.     Medial collateral ligament: Grade 2 injury     **TENDONS:     Quadriceps tendon: Intact.     Patella tendon: Intact.     Joint fluid: Moderate effusion     Hoffa's fat pad: Normal.     Intact medial retinaculum/ MPFL.     Intact lateral retinaculum.     **CARTILAGE:     Patellofemoral:Preserved medial and  lateral patellar facet cartilage.Median ridge demonstrates no focal abnormality.  Preserved trochlear groove cartilage.  Preservedanterior medial and anterior lateral femoral trochlea facet cartilage.     Medial tibiofemoral: Articular cartilage is preserved without focal defects or subchondral marrow edema.Internal aspect of medial femoral condyle cartilage is intact.     Lateral tibiofemoral: At site of deep femoral notch sign relating to pivot shift injury, there is 6 x 9 mm body, best identified sagittal image 23 coronal image 15 which may be that of loose body.     **OTHER:     Bone marrow: Osseous contusion pattern consistent with pivot shift injury involving the posterolateral tibial plateau and the midportion of the lateral femoral condyle with contrecoup contusion of the posterior lip of the medial tibial plateau.  Given the extent of abnormality on the sagittal images, associated subchondral fracture not excluded anterolateral femur.  Additionally, there is central intramedullary signal within the tibia  "somewhat reticulated yet linear in nature which may be that of nondisplaced fracture at that level as well.     Miscellaneous: The gastrocnemius muscles are normal.No evident plica thickening.     Impression:     Complete tear of the ACL with pivot-shift contusion pattern.  Associated nondisplaced micro trabecular fracture patterns as noted above.     Posterolateral corner injury manifest by "arcuate sign" fibular apex, irregularity of the arcuate ligament/posterior capsule, popliteal myotendinous injury with at least high-grade partial tear (likely disrupted) of the popliteofibular ligament, complex tear posterior horn lateral meniscus with meniscal gap and disruption of the lateral meniscus posterior fascicles.     **Correlation for potential posterolateral corner instability recommended.     Complex tear lateral meniscus posterior horn as noted above.     Suspect 6 x 9 mm body adjacent to the lateral femoral condyle at level of deep femoral notch sign.     Grade 2 injury of the superficial fibers MCL.  Disruption of deep meniscofemoral and meniscotibial fibers of MCL.     This report was flagged in Epic as abnormal.        Electronically signed by: Pancho Smith MD  Date:                                            10/29/2022  Time:                                           08:22                Meniscal tear.  ACL tear.  Results given to patient.  Referral for orthopedics given.  Patient may need to go to [facility] if not able to get in with [facility].  "

## 2023-02-16 ENCOUNTER — TELEPHONE (OUTPATIENT)
Dept: SPORTS MEDICINE | Facility: CLINIC | Age: 23
End: 2023-02-16
Payer: MEDICAID

## 2023-02-16 DIAGNOSIS — M25.561 RIGHT KNEE PAIN, UNSPECIFIED CHRONICITY: Primary | ICD-10-CM

## 2023-02-16 NOTE — TELEPHONE ENCOUNTER
----- Message from Gerhard Rosario sent at 2/16/2023  9:51 AM CST -----  Hi,  Can you see about getting this patient in with [facility] in [place] either tomorrow or Monday? Need to get him established with us.    ----- Message -----  From: Shelby Zamora PA-C  Sent: 2/16/2023   9:43 AM CST  To: Gerhard Rosario    I reviewed this patient with Bertha and I am not comfortable seeing him. He wants him to be seen by sports.     Do you want to schedule this or do you want my MA to do it?     Thanks for your help!    Shelby     ----- Message -----  From: Gerhard Rosario  Sent: 2/16/2023   8:51 AM CST  To: Shelby Zamora PA-C    Lets have him stay with you for this initial visit. Dr aHrt will be out of the [place] clinic next week for surgery and the holiday and [facility] will only be there Monday next week for a half day.  Lets at least get him seen with you for the MRI review and initial eval to get him established and then take it form there. I will be out next week as well, so after you see patient, send message to [facility] please so that we can get things going.    ----- Message -----  From: Shelby Zamora PA-C  Sent: 2/16/2023   8:41 AM CST  To: Gerhard Hennessy,     This 22 year old is on my schedule for Monday and he has an MRI showing a complete tear of his ACL. I am happy to see him, but it would make more sense for him to see [facility] or Dr. Hart as I will be sending him your way anyway.     Can you get him moved please? If there's an issue, just let me know and I will see him.     Thank you!    Shelby

## 2023-02-16 NOTE — TELEPHONE ENCOUNTER
Spoke with patient. Patient is scheduled with Joe Sykes PA-C on 02/17 at 10:00 am along with x-rays at 9:30 am. Patient is aware of appointment and x-rays details. Patient confirmed his appointment.

## 2023-02-17 ENCOUNTER — TELEPHONE (OUTPATIENT)
Dept: SPORTS MEDICINE | Facility: CLINIC | Age: 23
End: 2023-02-17
Payer: MEDICAID

## 2023-02-17 NOTE — TELEPHONE ENCOUNTER
CECIL in regards to patient appointment that was scheduled for 02/17 at 10:00 am along with x-rays that were scheduled for 9:30 am. Patient was informed to give me a call back.

## 2023-06-20 ENCOUNTER — PATIENT MESSAGE (OUTPATIENT)
Dept: RESEARCH | Facility: HOSPITAL | Age: 23
End: 2023-06-20
Payer: MEDICAID